# Patient Record
Sex: FEMALE | Race: WHITE | NOT HISPANIC OR LATINO | Employment: FULL TIME | URBAN - METROPOLITAN AREA
[De-identification: names, ages, dates, MRNs, and addresses within clinical notes are randomized per-mention and may not be internally consistent; named-entity substitution may affect disease eponyms.]

---

## 2022-12-31 ENCOUNTER — APPOINTMENT (OUTPATIENT)
Dept: RADIOLOGY | Facility: MEDICAL CENTER | Age: 37
End: 2022-12-31
Attending: EMERGENCY MEDICINE
Payer: COMMERCIAL

## 2022-12-31 ENCOUNTER — HOSPITAL ENCOUNTER (EMERGENCY)
Facility: MEDICAL CENTER | Age: 37
End: 2022-12-31
Attending: EMERGENCY MEDICINE
Payer: COMMERCIAL

## 2022-12-31 VITALS
SYSTOLIC BLOOD PRESSURE: 146 MMHG | HEART RATE: 86 BPM | DIASTOLIC BLOOD PRESSURE: 79 MMHG | HEIGHT: 72 IN | WEIGHT: 160.94 LBS | BODY MASS INDEX: 21.8 KG/M2 | RESPIRATION RATE: 16 BRPM | TEMPERATURE: 98 F | OXYGEN SATURATION: 98 %

## 2022-12-31 DIAGNOSIS — M79.89 LEG SWELLING: Primary | ICD-10-CM

## 2022-12-31 DIAGNOSIS — N39.0 ACUTE UTI: ICD-10-CM

## 2022-12-31 DIAGNOSIS — F15.11 HISTORY OF METHAMPHETAMINE ABUSE (HCC): ICD-10-CM

## 2022-12-31 LAB
ALBUMIN SERPL BCP-MCNC: 4.6 G/DL (ref 3.2–4.9)
ALBUMIN/GLOB SERPL: 1.5 G/DL
ALP SERPL-CCNC: 77 U/L (ref 30–99)
ALT SERPL-CCNC: 24 U/L (ref 2–50)
ANION GAP SERPL CALC-SCNC: 11 MMOL/L (ref 7–16)
APPEARANCE UR: CLEAR
AST SERPL-CCNC: 26 U/L (ref 12–45)
BACTERIA #/AREA URNS HPF: ABNORMAL /HPF
BASOPHILS # BLD AUTO: 0.9 % (ref 0–1.8)
BASOPHILS # BLD: 0.06 K/UL (ref 0–0.12)
BILIRUB SERPL-MCNC: 0.6 MG/DL (ref 0.1–1.5)
BILIRUB UR QL STRIP.AUTO: NEGATIVE
BLOOD CULTURE HOLD CXBCH: NORMAL
BUN SERPL-MCNC: 8 MG/DL (ref 8–22)
CALCIUM ALBUM COR SERPL-MCNC: 9.3 MG/DL (ref 8.5–10.5)
CALCIUM SERPL-MCNC: 9.8 MG/DL (ref 8.5–10.5)
CHLORIDE SERPL-SCNC: 105 MMOL/L (ref 96–112)
CO2 SERPL-SCNC: 24 MMOL/L (ref 20–33)
COLOR UR: YELLOW
CREAT SERPL-MCNC: 0.73 MG/DL (ref 0.5–1.4)
D DIMER PPP IA.FEU-MCNC: 0.49 UG/ML (FEU) (ref 0–0.5)
EKG IMPRESSION: NORMAL
EOSINOPHIL # BLD AUTO: 0.09 K/UL (ref 0–0.51)
EOSINOPHIL NFR BLD: 1.3 % (ref 0–6.9)
EPI CELLS #/AREA URNS HPF: ABNORMAL /HPF
ERYTHROCYTE [DISTWIDTH] IN BLOOD BY AUTOMATED COUNT: 45.6 FL (ref 35.9–50)
GFR SERPLBLD CREATININE-BSD FMLA CKD-EPI: 107 ML/MIN/1.73 M 2
GLOBULIN SER CALC-MCNC: 3.1 G/DL (ref 1.9–3.5)
GLUCOSE SERPL-MCNC: 94 MG/DL (ref 65–99)
GLUCOSE UR STRIP.AUTO-MCNC: NEGATIVE MG/DL
HCG SERPL QL: NEGATIVE
HCT VFR BLD AUTO: 41.7 % (ref 37–47)
HGB BLD-MCNC: 13.9 G/DL (ref 12–16)
HYALINE CASTS #/AREA URNS LPF: ABNORMAL /LPF
IMM GRANULOCYTES # BLD AUTO: 0.04 K/UL (ref 0–0.11)
IMM GRANULOCYTES NFR BLD AUTO: 0.6 % (ref 0–0.9)
KETONES UR STRIP.AUTO-MCNC: ABNORMAL MG/DL
LEUKOCYTE ESTERASE UR QL STRIP.AUTO: NEGATIVE
LYMPHOCYTES # BLD AUTO: 1.94 K/UL (ref 1–4.8)
LYMPHOCYTES NFR BLD: 28.4 % (ref 22–41)
MCH RBC QN AUTO: 31.1 PG (ref 27–33)
MCHC RBC AUTO-ENTMCNC: 33.3 G/DL (ref 33.6–35)
MCV RBC AUTO: 93.3 FL (ref 81.4–97.8)
MICRO URNS: ABNORMAL
MONOCYTES # BLD AUTO: 0.46 K/UL (ref 0–0.85)
MONOCYTES NFR BLD AUTO: 6.7 % (ref 0–13.4)
NEUTROPHILS # BLD AUTO: 4.23 K/UL (ref 2–7.15)
NEUTROPHILS NFR BLD: 62.1 % (ref 44–72)
NITRITE UR QL STRIP.AUTO: POSITIVE
NRBC # BLD AUTO: 0 K/UL
NRBC BLD-RTO: 0 /100 WBC
NT-PROBNP SERPL IA-MCNC: 496 PG/ML (ref 0–125)
PH UR STRIP.AUTO: 5.5 [PH] (ref 5–8)
PLATELET # BLD AUTO: 300 K/UL (ref 164–446)
PMV BLD AUTO: 9.7 FL (ref 9–12.9)
POTASSIUM SERPL-SCNC: 3.8 MMOL/L (ref 3.6–5.5)
PROT SERPL-MCNC: 7.7 G/DL (ref 6–8.2)
PROT UR QL STRIP: NEGATIVE MG/DL
RBC # BLD AUTO: 4.47 M/UL (ref 4.2–5.4)
RBC # URNS HPF: ABNORMAL /HPF
RBC UR QL AUTO: NEGATIVE
SODIUM SERPL-SCNC: 140 MMOL/L (ref 135–145)
SP GR UR STRIP.AUTO: 1.03
TROPONIN T SERPL-MCNC: 11 NG/L (ref 6–19)
UROBILINOGEN UR STRIP.AUTO-MCNC: 1 MG/DL
WBC # BLD AUTO: 6.8 K/UL (ref 4.8–10.8)
WBC #/AREA URNS HPF: ABNORMAL /HPF

## 2022-12-31 PROCEDURE — 93005 ELECTROCARDIOGRAM TRACING: CPT | Performed by: EMERGENCY MEDICINE

## 2022-12-31 PROCEDURE — 700102 HCHG RX REV CODE 250 W/ 637 OVERRIDE(OP): Performed by: EMERGENCY MEDICINE

## 2022-12-31 PROCEDURE — 80053 COMPREHEN METABOLIC PANEL: CPT

## 2022-12-31 PROCEDURE — 83880 ASSAY OF NATRIURETIC PEPTIDE: CPT

## 2022-12-31 PROCEDURE — 94760 N-INVAS EAR/PLS OXIMETRY 1: CPT

## 2022-12-31 PROCEDURE — 84484 ASSAY OF TROPONIN QUANT: CPT

## 2022-12-31 PROCEDURE — 85025 COMPLETE CBC W/AUTO DIFF WBC: CPT

## 2022-12-31 PROCEDURE — 81001 URINALYSIS AUTO W/SCOPE: CPT

## 2022-12-31 PROCEDURE — 36415 COLL VENOUS BLD VENIPUNCTURE: CPT

## 2022-12-31 PROCEDURE — 85379 FIBRIN DEGRADATION QUANT: CPT

## 2022-12-31 PROCEDURE — 99284 EMERGENCY DEPT VISIT MOD MDM: CPT

## 2022-12-31 PROCEDURE — A9270 NON-COVERED ITEM OR SERVICE: HCPCS | Performed by: EMERGENCY MEDICINE

## 2022-12-31 PROCEDURE — 84703 CHORIONIC GONADOTROPIN ASSAY: CPT

## 2022-12-31 PROCEDURE — 71045 X-RAY EXAM CHEST 1 VIEW: CPT

## 2022-12-31 RX ORDER — CEPHALEXIN 500 MG/1
500 CAPSULE ORAL 4 TIMES DAILY
Qty: 20 CAPSULE | Refills: 0 | Status: SHIPPED | OUTPATIENT
Start: 2022-12-31 | End: 2023-01-05

## 2022-12-31 RX ORDER — CEPHALEXIN 500 MG/1
500 CAPSULE ORAL ONCE
Status: COMPLETED | OUTPATIENT
Start: 2022-12-31 | End: 2022-12-31

## 2022-12-31 RX ORDER — FUROSEMIDE 20 MG/1
20 TABLET ORAL 2 TIMES DAILY
Qty: 20 TABLET | Refills: 0 | Status: SHIPPED | OUTPATIENT
Start: 2022-12-31 | End: 2023-01-10

## 2022-12-31 RX ADMIN — CEPHALEXIN 500 MG: 500 CAPSULE ORAL at 20:51

## 2022-12-31 ASSESSMENT — LIFESTYLE VARIABLES: DOES PATIENT WANT TO STOP DRINKING: NO

## 2023-01-01 NOTE — ED PROVIDER NOTES
"ED Provider Note    CHIEF COMPLAINT  Chief Complaint   Patient presents with    Foot Swelling     EXTERNAL RECORDS REVIEWED  Select: Inpatient Notes reviewed and Outpatient Notes reviewed    HPI  LIMITATION TO HISTORY   Select: : None  OUTSIDE HISTORIAN(S):  Select: None    Cynthia Schultz is a 37 y.o. female who presents to the Emergency Department for bilateral foot swelling onset 8 months ago. The patient reports that she was seen at [place] 8 months ago and was instructed to wear compression socks. She states has been wearing them with alleviation, however a few months ago noticed a return of the swelling. She reports additional symptoms of dyspnea on exertion, hand swelling, and occasional eye swelling. The  patient states it feels like her \"legs are stretching.\" She notes she has been elevating her legs with little alleviation. She denies symptoms of fever, nausea, abdominal pain, or vomiting. She states she vapes, but denies the use of drugs or alcohol. The patient states her family has a medical issues of heart issues. No exacerbating factors noted.     REVIEW OF SYSTEMS  See HPI for further details. All other systems are negative.     PAST MEDICAL HISTORY   has a past medical history of ABNORMALITY OF CERVIX, Backpain, Bipolar 1 disorder (HCC), Depression, and Hypothyroid.    SURGICAL HISTORY   has a past surgical history that includes other.    FAMILY HISTORY  Family History   Problem Relation Age of Onset    Cancer Mother     Heart Disease Mother     Diabetes Paternal Grandmother      SOCIAL HISTORY  Social History     Tobacco Use    Smoking status: Former     Packs/day: 1.00     Types: Cigarettes    Smokeless tobacco: Not on file    Tobacco comments:     1/2 pk/day - on patch   Vaping Use    Vaping Use: Every day    Substances: Nicotine, THC   Substance and Sexual Activity    Alcohol use: No     Comment: denies    Drug use: Not Currently     Comment: meth    Sexual activity: Not on file     CURRENT " MEDICATIONS  Home Medications       Reviewed by Iris Webb R.N. (Registered Nurse) on 12/31/22 at 1720  Med List Status: Partial     Medication Last Dose Status   CarBAMazepine (TEGRETOL PO)  Active   risperiDONE (RISPERDAL) 2 MG Tab not taking Active   trazodone (DESYREL) 50 MG TABS not taking Active                  ALLERGIES  No Known Allergies    PHYSICAL EXAM  VITAL SIGNS:   Vitals:    12/31/22 1803 12/31/22 1804 12/31/22 1831 12/31/22 2000   BP:   132/84 (!) 146/79   Pulse: 76 77  86   Resp:    16   Temp:       TempSrc:       SpO2: 97% 99%  98%   Weight:       Height:         Vitals: My interpretation: normotensive, tachycardic, afebrile, not hypoxic    Reinterpretation of vitals:   Constitutional: Well developed, Well nourished, No acute distress, Non-toxic appearance.   HENT: Normocephalic, Atraumatic, Bilateral external ears normal, Oropharynx is clear mucous membranes are moist. No oral exudates or nasal discharge.   Eyes: Pupils are equal round and reactive, EOMI, Conjunctiva normal, No discharge.   Neck: Normal range of motion, No tenderness, Supple, No stridor. No meningismus.  Lymphatic: No lymphadenopathy noted.   Cardiovascular: Regular rate and rhythm without murmur rub or gallop.  Thorax & Lungs: Clear breath sounds bilaterally without wheezes, rhonchi or rales. There is no chest wall tenderness.   Abdomen: Soft non-tender non-distended. There is no rebound or guarding. No organomegaly is appreciated. Bowel sounds are normal.  Skin: Normal without rash.   Back: No CVA or spinal tenderness.   Extremities: Intact distal pulses, there is mild 1+ pitting edema up to the midshin bilaterally, minimally tender secondary to swelling, no calf popliteal or thigh tenderness bilaterally, No tenderness, No cyanosis, No clubbing. Capillary refill is less than 2 seconds.  Musculoskeletal: Good range of motion in all major joints. No tenderness to palpation or major deformities noted.   Neurologic: Alert  & oriented x 3, Normal motor function, Normal sensory function, No focal deficits noted. Reflexes are normal.  Psychiatric: Affect normal, Judgment normal, Mood normal. There is no suicidal ideation or patient reported hallucinations.     DIAGNOSTIC STUDIES / PROCEDURES    LABS  Results for orders placed or performed during the hospital encounter of 12/31/22   CBC WITH DIFFERENTIAL   Result Value Ref Range    WBC 6.8 4.8 - 10.8 K/uL    RBC 4.47 4.20 - 5.40 M/uL    Hemoglobin 13.9 12.0 - 16.0 g/dL    Hematocrit 41.7 37.0 - 47.0 %    MCV 93.3 81.4 - 97.8 fL    MCH 31.1 27.0 - 33.0 pg    MCHC 33.3 (L) 33.6 - 35.0 g/dL    RDW 45.6 35.9 - 50.0 fL    Platelet Count 300 164 - 446 K/uL    MPV 9.7 9.0 - 12.9 fL    Neutrophils-Polys 62.10 44.00 - 72.00 %    Lymphocytes 28.40 22.00 - 41.00 %    Monocytes 6.70 0.00 - 13.40 %    Eosinophils 1.30 0.00 - 6.90 %    Basophils 0.90 0.00 - 1.80 %    Immature Granulocytes 0.60 0.00 - 0.90 %    Nucleated RBC 0.00 /100 WBC    Neutrophils (Absolute) 4.23 2.00 - 7.15 K/uL    Lymphs (Absolute) 1.94 1.00 - 4.80 K/uL    Monos (Absolute) 0.46 0.00 - 0.85 K/uL    Eos (Absolute) 0.09 0.00 - 0.51 K/uL    Baso (Absolute) 0.06 0.00 - 0.12 K/uL    Immature Granulocytes (abs) 0.04 0.00 - 0.11 K/uL    NRBC (Absolute) 0.00 K/uL   COMP METABOLIC PANEL   Result Value Ref Range    Sodium 140 135 - 145 mmol/L    Potassium 3.8 3.6 - 5.5 mmol/L    Chloride 105 96 - 112 mmol/L    Co2 24 20 - 33 mmol/L    Anion Gap 11.0 7.0 - 16.0    Glucose 94 65 - 99 mg/dL    Bun 8 8 - 22 mg/dL    Creatinine 0.73 0.50 - 1.40 mg/dL    Calcium 9.8 8.5 - 10.5 mg/dL    AST(SGOT) 26 12 - 45 U/L    ALT(SGPT) 24 2 - 50 U/L    Alkaline Phosphatase 77 30 - 99 U/L    Total Bilirubin 0.6 0.1 - 1.5 mg/dL    Albumin 4.6 3.2 - 4.9 g/dL    Total Protein 7.7 6.0 - 8.2 g/dL    Globulin 3.1 1.9 - 3.5 g/dL    A-G Ratio 1.5 g/dL   TROPONIN   Result Value Ref Range    Troponin T 11 6 - 19 ng/L   proBrain Natriuretic Peptide, NT   Result Value  Ref Range    NT-proBNP 496 (H) 0 - 125 pg/mL   HCG QUAL SERUM   Result Value Ref Range    Beta-Hcg Qualitative Serum Negative Negative   URINALYSIS (UA)    Specimen: Urine   Result Value Ref Range    Color Yellow     Character Clear     Specific Gravity 1.032 <1.035    Ph 5.5 5.0 - 8.0    Glucose Negative Negative mg/dL    Ketones Trace (A) Negative mg/dL    Protein Negative Negative mg/dL    Bilirubin Negative Negative    Urobilinogen, Urine 1.0 Negative    Nitrite Positive (A) Negative    Leukocyte Esterase Negative Negative    Occult Blood Negative Negative    Micro Urine Req Microscopic    CORRECTED CALCIUM   Result Value Ref Range    Correct Calcium 9.3 8.5 - 10.5 mg/dL   ESTIMATED GFR   Result Value Ref Range    GFR (CKD-EPI) 107 >60 mL/min/1.73 m 2   D-DIMER   Result Value Ref Range    D-Dimer Screen 0.49 0.00 - 0.50 ug/mL (FEU)   Blood Culture,Hold   Result Value Ref Range    Blood Culture Hold Collected    URINE MICROSCOPIC (W/UA)   Result Value Ref Range    WBC 2-5 /hpf    RBC 0-2 /hpf    Bacteria Many (A) None /hpf    Epithelial Cells Few /hpf    Hyaline Cast 3-5 (A) /lpf   EKG (NOW)   Result Value Ref Range    Report       [facility] Emergency Dept.    Test Date:  2022  Pt Name:    NEIL PAIGE             Department: ER  MRN:        [id number]                      Room:       ORTHO  Gender:     Female                       Technician: [id number]  :        1985                   Requested By:GURJIT ARORA  Order #:    [id number]                    Reading MD: Gurjit Arora    Measurements  Intervals                                Axis  Rate:       76                           P:          77  AK:         128                          QRS:        70  QRSD:       106                          T:          14  QT:         428  QTc:        482    Interpretive Statements  Sinus rhythm  RSR' in V1 or V2, probably normal variant  Nonspecific T abnormalities, anterior  leads  No previous ECG available for comparison  Electronically Signed On 12- 19:11:20 PST by Orville Castillo       All labs reviewed by me. Labs were compared to prior labs if they were available. Significant for no leukocytosis, no anemia, normal electrolytes, normal renal function, normal liver enzymes, normal bilirubin, troponin normal, BNP normal, pregnancy level negative, urinalysis shows infection    RADIOLOGY  I have independently interpreted the diagnostic imaging associated with this visit and am waiting the final reading from the radiologist.   DX-CHEST-PORTABLE (1 VIEW)   Final Result      1.  There is no acute cardiopulmonary process.      US-EXTREMITY VENOUS LOWER BILAT    (Results Pending)     COURSE & MEDICAL DECISION MAKING  Pertinent Labs & Imaging studies reviewed. (See chart for details)    ED Observation Status? No; Patient does not meet criteria for ED Observation.     INITIAL ASSESSMENT AND PLAN    Escalation of care considered, and ultimately not performed: blood analysis, diagnostic imaging, and acute inpatient care management, however at this time, the patient is most appropriate for outpatient management.     Barriers to care at this time, including but not limited to: Select: None .     Diagnostic tests and prescription drugs considered including, but not limited to: Select: PERC rule negative .    MDM:   6:07 PM -this is a 37-year-old female with a remote history of methamphetamine abuse, presenting for chronic issue of bilateral leg swelling for the past 8 months.  She was seen at [place], had a work-up she states and told to wear compression stockings, keep her feet elevated and follow-up with her primary care physician.  She just recently establish care, recently was approved for Medicaid, but has not seen her PCP yet.  She denies any significant other symptoms.  Her vital signs unremarkable.  Physical exam shows some very mild bilateral lower extremity edema but no calf,  popliteal or thigh tenderness.  She underwent a work-up for congestive heart failure including labs, chest x-ray.  All labs reviewed by me. Labs were compared to prior labs if they were available. Significant for no leukocytosis, no anemia, normal electrolytes, normal renal function, normal liver enzymes, normal bilirubin, troponin normal, BNP normal, pregnancy level negative, urinalysis shows infection.  Chest x-ray is within normal limits.  EKG unremarkable for any signs of ischemia.  DVT ultrasound ordered, however due to the snowstorm, the tach is not able to get in for several hours, so D-dimer was done which was negative I feel reassured with patient's chronicity of leg swelling that this is more likely dependent edema rather than bilateral DVT considering especially her D-dimer being negative.  She was started on Keflex, Rx for Lasix, and recommend follow-up.  At this time I recommend continued compression stockings, elevation and PCP follow-up for further evaluation and treatment.  Patient is amatory, weightbearing, verbalized understanding strict return cautions outpatient follow-up plan and is amenable.     FINAL PROBLEM LIST AND DISPOSITION    In addition to the chief complaint, the following problems were addressed: Outpatient follow-up, stockings compression style    I have discussed management of the patient with the following physicians and KATHRYN's: Not discussed    Discussion of management with other QHP or appropriate source(s): Select: None     The patient will not drink alcohol nor drive with prescribed medications. The patient will return for worsening symptoms and is stable at the time of discharge. The patient verbalizes understanding and will comply.    FINAL IMPRESSION  1. Leg swelling Acute   2. History of methamphetamine abuse (HCC) Acute   3. Acute UTI Acute     I, Davina Lea (Scribe), am scribing for, and in the presence of, Orville Castillo.    Electronically signed by:  Davina Lea ([facility]), 12/31/2022     Electronically signed by: Orville Castillo, 12/31/2022 5:53 PM

## 2023-01-01 NOTE — ED TRIAGE NOTES
Pt to ED with complaints of bilateral LE swelling that she report is ongoing since April but was much worse in the last 2 days. Left side worse than right. +edema on assessment. She denies any medical hx of has not be able to see a doctor recently.

## 2023-01-01 NOTE — DISCHARGE INSTRUCTIONS
Your labs show that you have a urinary tract infection.  You are started on Keflex tonight and I sent in a prescription to the pharmacy for you to  in the morning.  For your leg swelling, there is no sign of a blood clot, congestive heart failure, it is unclear what is causing at this time.  I want you to continue using the compression stockings 24 hours a day, keep your legs elevated as much as possible above your heart, and take the Lasix pill twice a day once the morning once evening for 10 days to help with the swelling.  You need to follow-up with your primary care physician for further evaluation and treatment.  Thank you for coming in today.  Come back to emerge department if you have any worsening symptoms or concerns.

## 2023-03-18 ENCOUNTER — APPOINTMENT (OUTPATIENT)
Dept: URGENT CARE | Facility: CLINIC | Age: 38
End: 2023-03-18

## 2023-03-18 ENCOUNTER — OFFICE VISIT (OUTPATIENT)
Dept: URGENT CARE | Facility: CLINIC | Age: 38
End: 2023-03-18
Payer: COMMERCIAL

## 2023-03-18 ENCOUNTER — HOSPITAL ENCOUNTER (OUTPATIENT)
Facility: MEDICAL CENTER | Age: 38
End: 2023-03-18
Attending: FAMILY MEDICINE
Payer: COMMERCIAL

## 2023-03-18 VITALS
RESPIRATION RATE: 12 BRPM | HEIGHT: 72 IN | WEIGHT: 164.2 LBS | HEART RATE: 107 BPM | SYSTOLIC BLOOD PRESSURE: 112 MMHG | TEMPERATURE: 99.5 F | OXYGEN SATURATION: 100 % | BODY MASS INDEX: 22.24 KG/M2 | DIASTOLIC BLOOD PRESSURE: 68 MMHG

## 2023-03-18 DIAGNOSIS — L02.31 ABSCESS OF BUTTOCK, LEFT: ICD-10-CM

## 2023-03-18 PROCEDURE — 87147 CULTURE TYPE IMMUNOLOGIC: CPT

## 2023-03-18 PROCEDURE — 87077 CULTURE AEROBIC IDENTIFY: CPT

## 2023-03-18 PROCEDURE — 87205 SMEAR GRAM STAIN: CPT

## 2023-03-18 PROCEDURE — 87186 SC STD MICRODIL/AGAR DIL: CPT

## 2023-03-18 PROCEDURE — 87070 CULTURE OTHR SPECIMN AEROBIC: CPT

## 2023-03-18 PROCEDURE — 10060 I&D ABSCESS SIMPLE/SINGLE: CPT | Performed by: FAMILY MEDICINE

## 2023-03-18 RX ORDER — SULFAMETHOXAZOLE AND TRIMETHOPRIM 800; 160 MG/1; MG/1
1 TABLET ORAL EVERY 12 HOURS
Qty: 14 TABLET | Refills: 0 | Status: SHIPPED | OUTPATIENT
Start: 2023-03-18 | End: 2023-03-25

## 2023-03-18 ASSESSMENT — FIBROSIS 4 INDEX: FIB4 SCORE: 0.67

## 2023-03-18 ASSESSMENT — ENCOUNTER SYMPTOMS: BRUISES/BLEEDS EASILY: 0

## 2023-03-18 NOTE — LETTER
March 18, 2023         Patient: Cynthia Schultz   YOB: 1985   Date of Visit: 3/18/2023           To Whom it May Concern:    Cynthia Schultz was seen in my clinic on 3/18/2023. Please excuse from work 3/18 and 3/19/2023.       Sincerely,           Braxton Frederick M.D.  Electronically Signed

## 2023-03-19 LAB
GRAM STN SPEC: NORMAL
SIGNIFICANT IND 70042: NORMAL
SITE SITE: NORMAL
SOURCE SOURCE: NORMAL

## 2023-03-19 NOTE — PROGRESS NOTES
"Subjective     Cynthia Schultz is a 37 y.o. female who presents with Bump (Pt has bump on (L) butt cheek, swelling x 1 week )            1 week progressively worse red painful bump left buttocks.  Scant blood and pus drainage.  Central scab.  No PMH/FH MRSA.  No fever.  No trauma.  No other aggravating alleviating factors.      Review of Systems   Skin:  Negative for itching and rash.   Endo/Heme/Allergies:  Does not bruise/bleed easily.            Objective     Pulse (!) 107   Temp 37.5 °C (99.5 °F) (Temporal)   Resp 12   Ht 1.88 m (6' 2\")   Wt 74.5 kg (164 lb 3.2 oz)   SpO2 100%   BMI 21.08 kg/m²      Physical Exam  Constitutional:       Appearance: Normal appearance.   Skin:     General: Skin is warm and dry.      Findings: No rash.          Neurological:      Mental Status: She is alert.             Procedure: Incision and Drainage  -Risks, benefits, and alternatives discussed. Risks include infection, bleeding, nerve damage, and poor cosmetic outcome  -Clean technique with sterile instruments  -Local anesthesia with 1% lidocaine  -Incision with #11 blade into fluctuant area with purulent material expressed  -Culture obtained and packaged for lab  -Cavity probed and any loculations bluntly taken down with hemostat  -Irrigated copiously with sterile saline  -Minimal bleeding with good hemostasis achieved  -The patient tolerated the procedure well               Assessment & Plan        1. Abscess of buttock, left    - sulfamethoxazole-trimethoprim (BACTRIM DS) 800-160 MG tablet; Take 1 Tablet by mouth every 12 hours for 7 days.  Dispense: 14 Tablet; Refill: 0  - CULTURE WOUND W/ GRAM STAIN; Future    Differential diagnosis, natural history, supportive care, and indications for immediate follow-up were discussed.      F/u culture  Wound care discussed             "

## 2023-03-22 LAB
BACTERIA WND AEROBE CULT: ABNORMAL
BACTERIA WND AEROBE CULT: ABNORMAL
GRAM STN SPEC: ABNORMAL
SIGNIFICANT IND 70042: ABNORMAL
SITE SITE: ABNORMAL
SOURCE SOURCE: ABNORMAL